# Patient Record
Sex: FEMALE | Race: WHITE | NOT HISPANIC OR LATINO | URBAN - METROPOLITAN AREA
[De-identification: names, ages, dates, MRNs, and addresses within clinical notes are randomized per-mention and may not be internally consistent; named-entity substitution may affect disease eponyms.]

---

## 2018-01-25 ENCOUNTER — EMERGENCY (EMERGENCY)
Facility: HOSPITAL | Age: 53
LOS: 1 days | Discharge: HOME | End: 2018-01-25
Admitting: EMERGENCY MEDICINE
Payer: MEDICAID

## 2018-01-25 VITALS
OXYGEN SATURATION: 98 % | RESPIRATION RATE: 20 BRPM | HEART RATE: 75 BPM | SYSTOLIC BLOOD PRESSURE: 144 MMHG | DIASTOLIC BLOOD PRESSURE: 88 MMHG | TEMPERATURE: 98 F

## 2018-01-25 VITALS
RESPIRATION RATE: 20 BRPM | HEART RATE: 74 BPM | TEMPERATURE: 98 F | DIASTOLIC BLOOD PRESSURE: 94 MMHG | OXYGEN SATURATION: 95 % | SYSTOLIC BLOOD PRESSURE: 147 MMHG

## 2018-01-25 DIAGNOSIS — B36.9 SUPERFICIAL MYCOSIS, UNSPECIFIED: ICD-10-CM

## 2018-01-25 DIAGNOSIS — F17.200 NICOTINE DEPENDENCE, UNSPECIFIED, UNCOMPLICATED: ICD-10-CM

## 2018-01-25 DIAGNOSIS — R21 RASH AND OTHER NONSPECIFIC SKIN ERUPTION: ICD-10-CM

## 2018-01-25 PROCEDURE — 99284 EMERGENCY DEPT VISIT MOD MDM: CPT

## 2018-01-25 NOTE — ED ADULT NURSE NOTE - CHPI ED SYMPTOMS NEG
no petechia/no inflammation/no pain/no decreased eating/drinking/no chills/no scaly patches on skin/no fever/no bruising/no itching/no vomiting

## 2018-01-25 NOTE — ED ADULT TRIAGE NOTE - CHIEF COMPLAINT QUOTE
Patient complaining of on and off low-grade fevers and rash to lower abdomen, groin, and behind bilateral knees with no recent travel, or sick contacts

## 2018-01-25 NOTE — ED PROVIDER NOTE - SKIN, MLM
Skin normal color for race, warm, dry and intact. +fungal appearing erythematous with satellite lesions surrounding area under pannus and to posterior knees bilateral no vesicular or pustular appearance and no petechia or purpura.

## 2018-01-25 NOTE — ED PROVIDER NOTE - OBJECTIVE STATEMENT
Pt is 53 yo female with no sig pmhx who presents with rash x 2 days.  Pt reports has been using baby oil after the shower and now with pruritic region of erythematous rash under her pannus and to the backs of her knees.  PT denies any fevers, chills, pain, travel, n/v/d.

## 2023-07-12 NOTE — ED ADULT NURSE NOTE - CAS DISCH TRANSFER METHOD
[facility], Orthopedics and Sports Medicine  [facility]    Shoulder Post-op Visit  07/12/2023     Diagnosis:  Right shoulder pain  High Grade Partial Rotator Cuff Tear  Biceps Tendonitis  Subacromial Impingement  Prior rotator cuff repair     Procedure: 5/2/23  Arthroscopic Rotator Cuff Repair   Open Biceps Tenodesis  Arthroscopic Shoulder Debridement - Extensive     Subjective:      Celia Alford is a 52 y.o. female who is now 10 weeks status post right shoulder surgery. The patient was doing okay, but not pain free, until recently when had worsening of shoulder pain in the anterior shoulder.  She then made an earlier than usual follow up appointment and presents today.    She is back to work light duty.     She is in therapy.      Objective:      Ortho/SPM Exam  General: alert, appears stated age, and cooperative   Sutures: Sutures out.  Incision: healing well, no significant drainage, no dehiscence, no significant erythema  Tenderness: mild  Forward Flexion: 80 degrees  External Rotation: 30 degrees  Elbow/Wrist/Hand: Full ROM  Neuro: Intact to light touch Ax/R/U/M  Vascular: CR<2s. Palpable radial pulse      Imaging:  None today      Assessment:       The patient is status post right shoulder surgery. The primary encounter diagnosis was Incomplete tear of right rotator cuff, unspecified whether traumatic. Diagnoses of Biceps tendonitis on right and Shoulder impingement syndrome, right were also pertinent to this visit.  Doing well postoperatively overall but having anterior knee pain. Continuation of post-op rehab course is recommended at this time. All of the patient's questions were answered.    Likely overexertion with therapy.  Will decrease intensity of therapy.  Strengthening only 1x per week, but continue modalities and gentle stretching the remaining visits per week.      Plan:      Tylenol 650mg TID PRN for pain.  Continue physical therapy.  Follow up at 3 months after  surgery.       Olman Pillai IV, MD   of Clinical Orthopedics  Department of Orthopedic Surgery  [facility]  Office: [phone]  Website: [email or web address]        No orders of the defined types were placed in this encounter.       Walking

## 2023-12-21 NOTE — ED ADULT TRIAGE NOTE - ADDITIONAL SAFETY/BANDS...
Wt Readings from Last 3 Encounters:   12/19/23 76.3 kg (168 lb 3.4 oz)   12/06/23 79.8 kg (176 lb)   09/05/23 79.4 kg (175 lb)     Echo in February of this year ejection fraction 65%, mild to moderate valvular disease at the mitral and tricuspid  Resume diuretics         Additional Safety/Bands: